# Patient Record
Sex: FEMALE | Race: WHITE | Employment: FULL TIME | URBAN - METROPOLITAN AREA
[De-identification: names, ages, dates, MRNs, and addresses within clinical notes are randomized per-mention and may not be internally consistent; named-entity substitution may affect disease eponyms.]

---

## 2017-03-31 ENCOUNTER — HOSPITAL ENCOUNTER (OUTPATIENT)
Dept: MRI IMAGING | Age: 37
Discharge: HOME | End: 2017-04-29
Attending: PSYCHIATRY & NEUROLOGY | Admitting: PSYCHIATRY & NEUROLOGY

## 2024-08-02 ENCOUNTER — OFFICE (OUTPATIENT)
Dept: URBAN - METROPOLITAN AREA CLINIC 18 | Facility: CLINIC | Age: 44
End: 2024-08-02

## 2024-08-02 VITALS
DIASTOLIC BLOOD PRESSURE: 78 MMHG | HEART RATE: 88 BPM | HEIGHT: 67 IN | WEIGHT: 241 LBS | SYSTOLIC BLOOD PRESSURE: 130 MMHG

## 2024-08-02 DIAGNOSIS — K59.00 CONSTIPATION, UNSPECIFIED: ICD-10-CM

## 2024-08-02 DIAGNOSIS — E66.9 OBESITY, UNSPECIFIED: ICD-10-CM

## 2024-08-02 DIAGNOSIS — R14.0 ABDOMINAL DISTENSION (GASEOUS): ICD-10-CM

## 2024-08-02 DIAGNOSIS — R10.30 LOWER ABDOMINAL PAIN, UNSPECIFIED: ICD-10-CM

## 2024-08-02 PROCEDURE — 99203 OFFICE O/P NEW LOW 30 MIN: CPT | Performed by: INTERNAL MEDICINE

## 2024-08-02 RX ORDER — IMIPRAMINE HYDROCHLORIDE 10 MG/1
TABLET, FILM COATED ORAL
Qty: 180 | Refills: 3 | Status: ACTIVE
Start: 2024-08-02

## 2025-05-17 LAB — MAMMOGRAPHY, EXTERNAL: NORMAL

## 2025-06-07 ASSESSMENT — RHEUMATOLOGY NEW PATIENT QUESTIONNAIRE
EASY BRUISING: Y
EYE REDNESS: N
SHORTNESS OF BREATH: N
BLOOD IN STOOLS: N
JOINT PAIN: N
SWOLLEN OR TENDER GLANDS: Y
STOMACH PAIN: Y
DIFFICULTY STAYING ASLEEP: Y
BEHAVIORAL CHANGES: N
ANXIETY: N
FAINTING: Y
LOSS OF VISION: N
DIFFICULTY SWALLOWING: N
SEIZURES: N
SWOLLEN LEGS OR FEET: Y
NODULES/BUMPS: N
INCREASED SUSCEPTIBILITY TO INFECTION: Y
EYE PAIN: N
MEMORY LOSS: N
LOSS OF CONSCIOUSNESS: N
DIFFICULTY BREATHING LYING DOWN: N
MORNING STIFFNESS IN LOWER BACK: Y
SKIN REDNESS: Y
HEADACHES: Y
VOMITING OF BLOOD OR COFFEE GROUND CONSISTENCY MATERIAL: N
NIGHT SWEATS: Y
NUMBNESS OR TINGLING IN HANDS OR FEET: Y
SKIN TIGHTNESS: N
VAGINAL DRYNESS: N
JOINT SWELLING: N
DOUBLE OR BLURRED VISION: Y
ANEMIA: Y
COUGH: N
DEPRESSION: N
DIFFICULTY FALLING ASLEEP: Y
MUSCLE WEAKNESS: Y
UNEXPLAINED HEARING LOSS: N
ABNORMAL URINE: Y
EASILY LOSING TEMPER: N
UNUSUAL FATIGUE: Y
SORES IN MOUTH OR NOSE: Y
MORNING STIFFNESS: Y
BLACK STOOLS: N
UNUSUAL BLEEDING: N
HEARTBURN OR REFLUX: N
COLOR CHANGES OF HANDS OR FEET IN THE COLD: N
RASH OR ULCERS: N
CHEST PAIN: N
UNUSUALLY RAPID OR SLOWED HEART RATE: Y
SUN SENSITIVE (SUN ALLERGY): Y
PAIN OR BURNING ON URINATION: N
HOARSE VOICE: Y
AGITATION: N
EXCESSIVE HAIR LOSS (MORE THAN YOUR NORM): N
UNEXPLAINED WEIGHT CHANGE: Y
PERSISTENT DIARRHEA: N
HOW WOULD YOU DESCRIBE YOUR STIFFNESS ON AVERAGE: MODERATE
EYE DRYNESS: N
DRYNESS OF MOUTH: Y
NAUSEA: Y
JAUNDICE: N
RASH: N

## 2025-06-08 NOTE — PROGRESS NOTES
obtained history, counseling, ordering medications, tests, or procedures, documenting clinical information in the electronic or other health record and in care coordination.This note was dictated with voice recognition software        Wanda Garber MD

## 2025-06-10 ENCOUNTER — OFFICE VISIT (OUTPATIENT)
Age: 45
End: 2025-06-10
Payer: COMMERCIAL

## 2025-06-10 VITALS
OXYGEN SATURATION: 95 % | HEART RATE: 71 BPM | BODY MASS INDEX: 28.32 KG/M2 | WEIGHT: 180.8 LBS | DIASTOLIC BLOOD PRESSURE: 72 MMHG | SYSTOLIC BLOOD PRESSURE: 150 MMHG

## 2025-06-10 DIAGNOSIS — M79.7 FIBROMYALGIA: Primary | ICD-10-CM

## 2025-06-10 DIAGNOSIS — Z01.89 ENCOUNTER FOR OTHER SPECIFIED SPECIAL EXAMINATIONS: ICD-10-CM

## 2025-06-10 DIAGNOSIS — R53.82 CHRONIC FATIGUE: ICD-10-CM

## 2025-06-10 DIAGNOSIS — M25.50 POLYARTHRALGIA: ICD-10-CM

## 2025-06-10 PROCEDURE — 99205 OFFICE O/P NEW HI 60 MIN: CPT | Performed by: STUDENT IN AN ORGANIZED HEALTH CARE EDUCATION/TRAINING PROGRAM

## 2025-06-10 RX ORDER — SEMAGLUTIDE 1.7 MG/.75ML
INJECTION, SOLUTION SUBCUTANEOUS
COMMUNITY
Start: 2025-05-28

## 2025-06-10 NOTE — PATIENT INSTRUCTIONS
Patient Instructions  Complete ordered labs   We will discuss results at next visit  You will be notified when   RTC in 6 weeks

## 2025-06-20 ENCOUNTER — RESULTS FOLLOW-UP (OUTPATIENT)
Age: 45
End: 2025-06-20

## 2025-06-20 LAB
A/G RATIO: 2 RATIO (ref 0.8–2.6)
ALBUMIN: 4.6 G/DL (ref 3.5–5.2)
ALBUMIN: 4.6 G/DL (ref 3.5–5.2)
ALP BLD-CCNC: 72 U/L (ref 23–144)
ALT SERPL-CCNC: 12 U/L (ref 0–60)
AST SERPL-CCNC: 17 U/L (ref 0–55)
BILIRUB SERPL-MCNC: 0.5 MG/DL (ref 0–1.2)
BILIRUBIN DIRECT: 0.2 MG/DL (ref 0–0.4)
BILIRUBIN, INDIRECT: 0.3 MG/DL (ref 0–1.2)
BUN / CREAT RATIO: 13 (ref 7–25)
BUN BLDV-MCNC: 10 MG/DL (ref 3–29)
C REACTIVE PROTEIN (CRP), BODY FLUID: <0.3 MG/DL
CALCIUM SERPL-MCNC: 9.7 MG/DL (ref 8.5–10.5)
CHLORIDE BLD-SCNC: 107 MEQ/L (ref 96–110)
CO2: 23 MEQ/L (ref 19–32)
CREAT SERPL-MCNC: 0.8 MG/DL (ref 0.5–1.2)
CYCLIC CITRULLINATED PEPTIDE ANTIBODY IGG: <20 EIA
ESTIMATED GLOMERULAR FILTRATION RATE CREATININE EQUATION: 93 MLS/MIN/1.73M2
FASTING STATUS: NORMAL
GLOBULIN: 2.3 G/DL (ref 1.9–3.6)
GLUCOSE BLD-MCNC: 78 MG/DL (ref 70–99)
HAV IGM SER IA-ACNC: NEGATIVE
HCT VFR BLD CALC: 43.6 % (ref 34–49)
HEMOGLOBIN: 14.1 G/DL (ref 11.2–15.7)
HEP B S AGB SURF AG: NEGATIVE
HEPATITIS B CORE IGM ANTIBODY: NEGATIVE
HEPATITIS C VIRUS RNA SER/PLAS NCNC: NEGATIVE
MCH RBC QN AUTO: 29.4 PG (ref 26–34)
MCHC RBC AUTO-ENTMCNC: 32.3 G/DL (ref 30.7–35.5)
MCV RBC AUTO: 90.8 FL (ref 80–100)
PDW BLD-RTO: 12.8 %
PHOSPHORUS: 3.9 MG/DL (ref 2.5–4.5)
PLATELET # BLD: 179 K/UL (ref 140–400)
PMV BLD AUTO: 12.6 FL (ref 7.2–11.7)
POTASSIUM SERPL-SCNC: 4.1 MEQ/L (ref 3.4–5.3)
RBC # BLD: 4.8 M/UL (ref 3.95–5.26)
RHEUMATOID FACTOR: <10 IU/ML
SED RATE, AUTOMATED: 19 MM/HR (ref 0–20)
SODIUM BLD-SCNC: 143 MEQ/L (ref 135–148)
TOTAL PROTEIN: 6.9 G/DL (ref 6–8.3)
URIC ACID: 4.1 MG/DL (ref 2.5–7)
VITAMIN D 25-HYDROXY: 51 NG/ML (ref 30–100)
WBC # BLD: 5.9 K/UL (ref 3.5–10.9)

## 2025-06-22 LAB
INTERPRETATION: NEGATIVE
QUANTIFERON (R) TB GOLD (INCUBATED): <0.35 IU/ML

## 2025-08-05 ENCOUNTER — OFFICE VISIT (OUTPATIENT)
Age: 45
End: 2025-08-05
Payer: COMMERCIAL

## 2025-08-05 VITALS
HEART RATE: 88 BPM | SYSTOLIC BLOOD PRESSURE: 116 MMHG | WEIGHT: 170.6 LBS | DIASTOLIC BLOOD PRESSURE: 78 MMHG | OXYGEN SATURATION: 97 % | BODY MASS INDEX: 26.72 KG/M2

## 2025-08-05 DIAGNOSIS — M79.7 FIBROMYALGIA: Primary | ICD-10-CM

## 2025-08-05 DIAGNOSIS — R53.82 CHRONIC FATIGUE: ICD-10-CM

## 2025-08-05 PROCEDURE — 99215 OFFICE O/P EST HI 40 MIN: CPT | Performed by: STUDENT IN AN ORGANIZED HEALTH CARE EDUCATION/TRAINING PROGRAM
